# Patient Record
Sex: MALE | Race: WHITE | NOT HISPANIC OR LATINO | Employment: UNEMPLOYED | URBAN - METROPOLITAN AREA
[De-identification: names, ages, dates, MRNs, and addresses within clinical notes are randomized per-mention and may not be internally consistent; named-entity substitution may affect disease eponyms.]

---

## 2021-08-14 ENCOUNTER — IMMUNIZATION (OUTPATIENT)
Dept: PRIMARY CARE CLINIC | Facility: CLINIC | Age: 21
End: 2021-08-14
Payer: COMMERCIAL

## 2021-08-14 DIAGNOSIS — Z23 NEED FOR VACCINATION: Primary | ICD-10-CM

## 2021-08-14 PROCEDURE — 91303 COVID-19,VECTOR-NR,RS-AD26,PF,0.5 ML DOSE VACCINE (JANSSEN): ICD-10-PCS | Mod: S$GLB,,, | Performed by: FAMILY MEDICINE

## 2021-08-14 PROCEDURE — 0031A COVID-19,VECTOR-NR,RS-AD26,PF,0.5 ML DOSE VACCINE (JANSSEN): CPT | Mod: CV19,S$GLB,, | Performed by: FAMILY MEDICINE

## 2021-08-14 PROCEDURE — 91303 COVID-19,VECTOR-NR,RS-AD26,PF,0.5 ML DOSE VACCINE (JANSSEN): CPT | Mod: S$GLB,,, | Performed by: FAMILY MEDICINE

## 2021-08-14 PROCEDURE — 0031A COVID-19,VECTOR-NR,RS-AD26,PF,0.5 ML DOSE VACCINE (JANSSEN): ICD-10-PCS | Mod: CV19,S$GLB,, | Performed by: FAMILY MEDICINE

## 2022-01-08 ENCOUNTER — OFFICE VISIT (OUTPATIENT)
Dept: URGENT CARE | Facility: CLINIC | Age: 22
End: 2022-01-08
Payer: COMMERCIAL

## 2022-01-08 VITALS
HEART RATE: 68 BPM | DIASTOLIC BLOOD PRESSURE: 82 MMHG | SYSTOLIC BLOOD PRESSURE: 125 MMHG | TEMPERATURE: 98 F | WEIGHT: 175 LBS | OXYGEN SATURATION: 98 %

## 2022-01-08 DIAGNOSIS — H66.93 BILATERAL ACUTE OTITIS MEDIA: Primary | ICD-10-CM

## 2022-01-08 PROCEDURE — 1160F RVW MEDS BY RX/DR IN RCRD: CPT | Mod: S$GLB,,, | Performed by: STUDENT IN AN ORGANIZED HEALTH CARE EDUCATION/TRAINING PROGRAM

## 2022-01-08 PROCEDURE — 3074F PR MOST RECENT SYSTOLIC BLOOD PRESSURE < 130 MM HG: ICD-10-PCS | Mod: S$GLB,,, | Performed by: STUDENT IN AN ORGANIZED HEALTH CARE EDUCATION/TRAINING PROGRAM

## 2022-01-08 PROCEDURE — 99203 PR OFFICE/OUTPT VISIT, NEW, LEVL III, 30-44 MIN: ICD-10-PCS | Mod: S$GLB,,, | Performed by: STUDENT IN AN ORGANIZED HEALTH CARE EDUCATION/TRAINING PROGRAM

## 2022-01-08 PROCEDURE — 3079F PR MOST RECENT DIASTOLIC BLOOD PRESSURE 80-89 MM HG: ICD-10-PCS | Mod: S$GLB,,, | Performed by: STUDENT IN AN ORGANIZED HEALTH CARE EDUCATION/TRAINING PROGRAM

## 2022-01-08 PROCEDURE — 3074F SYST BP LT 130 MM HG: CPT | Mod: S$GLB,,, | Performed by: STUDENT IN AN ORGANIZED HEALTH CARE EDUCATION/TRAINING PROGRAM

## 2022-01-08 PROCEDURE — 1159F MED LIST DOCD IN RCRD: CPT | Mod: S$GLB,,, | Performed by: STUDENT IN AN ORGANIZED HEALTH CARE EDUCATION/TRAINING PROGRAM

## 2022-01-08 PROCEDURE — 3079F DIAST BP 80-89 MM HG: CPT | Mod: S$GLB,,, | Performed by: STUDENT IN AN ORGANIZED HEALTH CARE EDUCATION/TRAINING PROGRAM

## 2022-01-08 PROCEDURE — 1160F PR REVIEW ALL MEDS BY PRESCRIBER/CLIN PHARMACIST DOCUMENTED: ICD-10-PCS | Mod: S$GLB,,, | Performed by: STUDENT IN AN ORGANIZED HEALTH CARE EDUCATION/TRAINING PROGRAM

## 2022-01-08 PROCEDURE — 1159F PR MEDICATION LIST DOCUMENTED IN MEDICAL RECORD: ICD-10-PCS | Mod: S$GLB,,, | Performed by: STUDENT IN AN ORGANIZED HEALTH CARE EDUCATION/TRAINING PROGRAM

## 2022-01-08 PROCEDURE — 99203 OFFICE O/P NEW LOW 30 MIN: CPT | Mod: S$GLB,,, | Performed by: STUDENT IN AN ORGANIZED HEALTH CARE EDUCATION/TRAINING PROGRAM

## 2022-01-08 RX ORDER — AMOXICILLIN 875 MG/1
875 TABLET, FILM COATED ORAL EVERY 12 HOURS
Qty: 14 TABLET | Refills: 0 | Status: SHIPPED | OUTPATIENT
Start: 2022-01-08 | End: 2022-01-15

## 2022-01-08 NOTE — PROGRESS NOTES
Subjective:       Patient ID: Ashutosh Kohler is a 21 y.o. male.    Vitals:  weight is 79.4 kg (175 lb). His temperature is 98.2 °F (36.8 °C). His blood pressure is 125/82 and his pulse is 68. His oxygen saturation is 98%.     Chief Complaint: Otalgia (bilateral)    Patient is a 21-year-old male who presents to clinic for evaluation of possible ear infection.  Patient reports symptoms times 3-4 days.  Patient states has taken Tylenol with mild relief of symptoms.  Patient describes pain to be located to both ears.  Patient describes pain to be constant and sharp in nature.  Patient rates pain at current a 4 on 10 scale in as worse a 7 or 8 on 10 scale.  Patient denies any drainage, hearing loss or ringing to the ears.  Patient also denies any acute fever or chills, congestion or sore throat, chest pain or palpitations, shortness of breath or cough, generalized body aches, abdominal pain, nausea or vomiting, diarrhea, or any headaches or dizziness.  Patient states pain seems to be just localized to the ears.    Otalgia   There is pain in both ears. This is a new problem. The current episode started in the past 7 days. The problem occurs constantly. The problem has been unchanged. There has been no fever. The pain is at a severity of 4/10. The pain is mild. Pertinent negatives include no abdominal pain, coughing, diarrhea, ear discharge, headaches, hearing loss, rash, sore throat or vomiting. He has tried acetaminophen for the symptoms. The treatment provided mild relief.       Constitution: Negative. Negative for chills, sweating, fatigue and fever.   HENT: Positive for ear pain. Negative for ear discharge, hearing loss, congestion and sore throat.    Neck: neck negative.   Cardiovascular: Negative.  Negative for chest pain and palpitations.   Eyes: Negative.    Respiratory: Negative.  Negative for chest tightness, cough and shortness of breath.    Gastrointestinal: Negative.  Negative for abdominal pain, nausea,  vomiting and diarrhea.   Endocrine: negative.   Genitourinary: Negative.    Musculoskeletal: Negative.  Negative for muscle ache.   Skin: Negative.  Negative for color change, pale, rash and erythema.   Allergic/Immunologic: Negative.    Neurological: Negative.  Negative for dizziness, light-headedness, passing out, headaches, disorientation and altered mental status.   Hematologic/Lymphatic: Negative.    Psychiatric/Behavioral: Negative.  Negative for altered mental status, disorientation and confusion.       Objective:      Physical Exam   Constitutional: He is oriented to person, place, and time. He appears well-developed and well-nourished. He is cooperative.  Non-toxic appearance. He does not appear ill. No distress.   HENT:   Head: Normocephalic and atraumatic.   Ears:   Right Ear: Hearing, external ear and ear canal normal. Tympanic membrane is erythematous and bulging.   Left Ear: Hearing, external ear and ear canal normal. Tympanic membrane is erythematous and bulging.   Nose: Nose normal. No mucosal edema, rhinorrhea, nasal deformity or congestion. No epistaxis. Right sinus exhibits no maxillary sinus tenderness and no frontal sinus tenderness. Left sinus exhibits no maxillary sinus tenderness and no frontal sinus tenderness.   Mouth/Throat: Uvula is midline, oropharynx is clear and moist and mucous membranes are normal. Mucous membranes are moist. No trismus in the jaw. Normal dentition. No uvula swelling. No oropharyngeal exudate or posterior oropharyngeal erythema. Oropharynx is clear.   Eyes: Conjunctivae and lids are normal. Right eye exhibits no discharge. Left eye exhibits no discharge. No scleral icterus.   Neck: Trachea normal and phonation normal. Neck supple. No neck rigidity present.   Cardiovascular: Normal rate, regular rhythm, normal heart sounds, intact distal pulses and normal pulses.   Pulmonary/Chest: Effort normal and breath sounds normal. No respiratory distress. He has no wheezes. He  has no rhonchi. He has no rales.   Abdominal: Normal appearance and bowel sounds are normal. He exhibits no distension. Soft. There is no abdominal tenderness.   Musculoskeletal: Normal range of motion.         General: No deformity or edema. Normal range of motion.      Cervical back: He exhibits no tenderness.   Lymphadenopathy:     He has no cervical adenopathy.   Neurological: He is alert and oriented to person, place, and time. He exhibits normal muscle tone. Coordination normal.   Skin: Skin is warm, dry, intact, not diaphoretic, not pale and no rash. Capillary refill takes less than 2 seconds. No erythema   Psychiatric: He has a normal mood and affect. His speech is normal and behavior is normal. Judgment and thought content normal.   Nursing note and vitals reviewed.        Assessment:       1. Bilateral acute otitis media          Plan:         Bilateral acute otitis media    Other orders  -     amoxicillin (AMOXIL) 875 MG tablet; Take 1 tablet (875 mg total) by mouth every 12 (twelve) hours. for 7 days  Dispense: 14 tablet; Refill: 0                 Take medications as prescribed.  Tylenol/Motrin per package instructions for any pain or fever.  Follow-up PCP as needed.  Return to clinic as needed.  To ED for any new or acutely worsening symptoms.  Patient in agreement with plan of care.

## 2022-01-08 NOTE — PATIENT INSTRUCTIONS
Patient Education       Ear Infection ED   General Information   You came to the Emergency Department (ED) for an ear infection. An ear infection can cause ear pain and fever. You might also have trouble hearing from fluid buildup in the middle ear behind the eardrum.  Most ear infections are caused by viruses, but some are caused by bacteria. The doctor will wait to see if you get better on your own if they think the cause is a virus. The doctor will order antibiotic if they think the cause is a bacteria. Antibiotics kill bacteria, but they do not work on viruses.  If the doctor orders antibiotics, be sure to take all of them, even if you start to feel better.  What care is needed at home?   · Call your regular doctor to let them know you were in the ED. Make a follow-up appointment if you were told to.  · Do not put anything in your ear unless it was ordered by the doctor.  · You may want to take medicines like ibuprofen, naproxen, or acetaminophen to help with pain.  When do I need to call the doctor?   · Your symptoms are not getting better in 2 to 3 days.  · You continue to have problems hearing after 2 to 3 weeks.  · You have a fever of 100.4°F (38°C) or higher or chills.  · You have discharge or fluid coming from your ear.  · You have new or worsening symptoms.  Last Reviewed Date   2020-12-16  Consumer Information Use and Disclaimer   This information is not specific medical advice and does not replace information you receive from your health care provider. This is only a brief summary of general information. It does NOT include all information about conditions, illnesses, injuries, tests, procedures, treatments, therapies, discharge instructions or life-style choices that may apply to you. You must talk with your health care provider for complete information about your health and treatment options. This information should not be used to decide whether or not to accept your health care providers advice,  instructions or recommendations. Only your health care provider has the knowledge and training to provide advice that is right for you.  Copyright   Copyright © 2021 [employer] and its affiliates and/or licensors. All rights reserved.

## 2023-06-16 ENCOUNTER — LAB VISIT (OUTPATIENT)
Dept: LAB | Facility: HOSPITAL | Age: 23
End: 2023-06-16
Attending: STUDENT IN AN ORGANIZED HEALTH CARE EDUCATION/TRAINING PROGRAM
Payer: COMMERCIAL

## 2023-06-16 ENCOUNTER — HOSPITAL ENCOUNTER (OUTPATIENT)
Dept: PULMONOLOGY | Facility: CLINIC | Age: 23
Discharge: HOME | End: 2023-06-16
Payer: COMMERCIAL

## 2023-06-16 ENCOUNTER — OFFICE VISIT (OUTPATIENT)
Dept: ALLERGY | Facility: CLINIC | Age: 23
End: 2023-06-16
Payer: COMMERCIAL

## 2023-06-16 VITALS — SYSTOLIC BLOOD PRESSURE: 119 MMHG | DIASTOLIC BLOOD PRESSURE: 69 MMHG

## 2023-06-16 DIAGNOSIS — Z91.09 OTHER ALLERGY STATUS, OTHER THAN TO DRUGS AND BIOLOGICAL SUBSTANCES: ICD-10-CM

## 2023-06-16 DIAGNOSIS — Z86.59 HISTORY OF ADHD: ICD-10-CM

## 2023-06-16 DIAGNOSIS — Z91.010 HX OF PEANUT ALLERGY: ICD-10-CM

## 2023-06-16 DIAGNOSIS — J31.0 CHRONIC RHINITIS: ICD-10-CM

## 2023-06-16 DIAGNOSIS — Z91.010 HX OF PEANUT ALLERGY: Primary | ICD-10-CM

## 2023-06-16 DIAGNOSIS — R05.9 COUGH, UNSPECIFIED TYPE: Primary | ICD-10-CM

## 2023-06-16 DIAGNOSIS — Z87.09 HISTORY OF EXTRINSIC ASTHMA: ICD-10-CM

## 2023-06-16 DIAGNOSIS — R05.9 COUGH, UNSPECIFIED TYPE: ICD-10-CM

## 2023-06-16 LAB
FEF 25 75 LLN: 3.06
FEF 25 75 PRE REF: 73.3 %
FEF 25 75 REF: 4.74
FET100 CHG: -11.4 %
FEV05 LLN: 1.64
FEV05 REF: 3.07
FEV1 CHG: 1.4 %
FEV1 FVC LLN: 74
FEV1 FVC PRE REF: 85.2 %
FEV1 FVC REF: 85
FEV1 LLN: 3.55
FEV1 PRE REF: 100.7 %
FEV1 REF: 4.38
FEV1 VOL CHG: 0.06
FVC CHG: 1.5 %
FVC LLN: 4.19
FVC PRE REF: 117.4 %
FVC REF: 5.17
FVC VOL CHG: 0.09
IGE SERPL-ACNC: 58 IU/ML (ref 0–100)
PEF LLN: 7.5
PEF PRE REF: 83.7 %
PEF REF: 9.69
PHYSICIAN COMMENT: ABNORMAL
POST FEF 25 75: 3.64 L/S (ref 3.06–6.8)
POST FET 100: 6.59 SEC
POST FEV1 FVC: 72.63 % (ref 73.55–95.05)
POST FEV1: 4.47 L (ref 3.55–5.19)
POST FEV5: 3.04 L (ref 1.64–4.5)
POST FVC: 6.16 L (ref 4.19–6.16)
POST PEF: 7.54 L/S (ref 7.5–11.87)
PRE FEF 25 75: 3.48 L/S (ref 3.06–6.8)
PRE FET 100: 7.44 SEC
PRE FEV05 REF: 97 %
PRE FEV1 FVC: 72.68 % (ref 73.55–95.05)
PRE FEV1: 4.41 L (ref 3.55–5.19)
PRE FEV5: 2.98 L (ref 1.64–4.5)
PRE FVC: 6.07 L (ref 4.19–6.16)
PRE PEF: 8.11 L/S (ref 7.5–11.87)

## 2023-06-16 PROCEDURE — 1159F MED LIST DOCD IN RCRD: CPT | Mod: CPTII,S$GLB,, | Performed by: STUDENT IN AN ORGANIZED HEALTH CARE EDUCATION/TRAINING PROGRAM

## 2023-06-16 PROCEDURE — 99205 OFFICE O/P NEW HI 60 MIN: CPT | Mod: S$GLB,,, | Performed by: STUDENT IN AN ORGANIZED HEALTH CARE EDUCATION/TRAINING PROGRAM

## 2023-06-16 PROCEDURE — 1159F PR MEDICATION LIST DOCUMENTED IN MEDICAL RECORD: ICD-10-PCS | Mod: CPTII,S$GLB,, | Performed by: STUDENT IN AN ORGANIZED HEALTH CARE EDUCATION/TRAINING PROGRAM

## 2023-06-16 PROCEDURE — 3074F PR MOST RECENT SYSTOLIC BLOOD PRESSURE < 130 MM HG: ICD-10-PCS | Mod: CPTII,S$GLB,, | Performed by: STUDENT IN AN ORGANIZED HEALTH CARE EDUCATION/TRAINING PROGRAM

## 2023-06-16 PROCEDURE — 3078F DIAST BP <80 MM HG: CPT | Mod: CPTII,S$GLB,, | Performed by: STUDENT IN AN ORGANIZED HEALTH CARE EDUCATION/TRAINING PROGRAM

## 2023-06-16 PROCEDURE — 1160F PR REVIEW ALL MEDS BY PRESCRIBER/CLIN PHARMACIST DOCUMENTED: ICD-10-PCS | Mod: CPTII,S$GLB,, | Performed by: STUDENT IN AN ORGANIZED HEALTH CARE EDUCATION/TRAINING PROGRAM

## 2023-06-16 PROCEDURE — 82785 ASSAY OF IGE: CPT | Performed by: STUDENT IN AN ORGANIZED HEALTH CARE EDUCATION/TRAINING PROGRAM

## 2023-06-16 PROCEDURE — 86003 ALLG SPEC IGE CRUDE XTRC EA: CPT | Mod: 59 | Performed by: STUDENT IN AN ORGANIZED HEALTH CARE EDUCATION/TRAINING PROGRAM

## 2023-06-16 PROCEDURE — 3078F PR MOST RECENT DIASTOLIC BLOOD PRESSURE < 80 MM HG: ICD-10-PCS | Mod: CPTII,S$GLB,, | Performed by: STUDENT IN AN ORGANIZED HEALTH CARE EDUCATION/TRAINING PROGRAM

## 2023-06-16 PROCEDURE — 99999 PR PBB SHADOW E&M-EST. PATIENT-LVL III: ICD-10-PCS | Mod: PBBFAC,,, | Performed by: STUDENT IN AN ORGANIZED HEALTH CARE EDUCATION/TRAINING PROGRAM

## 2023-06-16 PROCEDURE — 3074F SYST BP LT 130 MM HG: CPT | Mod: CPTII,S$GLB,, | Performed by: STUDENT IN AN ORGANIZED HEALTH CARE EDUCATION/TRAINING PROGRAM

## 2023-06-16 PROCEDURE — 94060 EVALUATION OF WHEEZING: CPT | Mod: S$GLB,,, | Performed by: INTERNAL MEDICINE

## 2023-06-16 PROCEDURE — 94060 PR EVAL OF BRONCHOSPASM: ICD-10-PCS | Mod: S$GLB,,, | Performed by: INTERNAL MEDICINE

## 2023-06-16 PROCEDURE — 86003 ALLG SPEC IGE CRUDE XTRC EA: CPT | Performed by: STUDENT IN AN ORGANIZED HEALTH CARE EDUCATION/TRAINING PROGRAM

## 2023-06-16 PROCEDURE — 99205 PR OFFICE/OUTPT VISIT, NEW, LEVL V, 60-74 MIN: ICD-10-PCS | Mod: S$GLB,,, | Performed by: STUDENT IN AN ORGANIZED HEALTH CARE EDUCATION/TRAINING PROGRAM

## 2023-06-16 PROCEDURE — 99999 PR PBB SHADOW E&M-EST. PATIENT-LVL III: CPT | Mod: PBBFAC,,, | Performed by: STUDENT IN AN ORGANIZED HEALTH CARE EDUCATION/TRAINING PROGRAM

## 2023-06-16 PROCEDURE — 1160F RVW MEDS BY RX/DR IN RCRD: CPT | Mod: CPTII,S$GLB,, | Performed by: STUDENT IN AN ORGANIZED HEALTH CARE EDUCATION/TRAINING PROGRAM

## 2023-06-16 PROCEDURE — 36415 COLL VENOUS BLD VENIPUNCTURE: CPT | Performed by: STUDENT IN AN ORGANIZED HEALTH CARE EDUCATION/TRAINING PROGRAM

## 2023-06-16 PROCEDURE — 86008 ALLG SPEC IGE RECOMB EA: CPT | Mod: 59 | Performed by: STUDENT IN AN ORGANIZED HEALTH CARE EDUCATION/TRAINING PROGRAM

## 2023-06-16 RX ORDER — ALBUTEROL SULFATE 0.83 MG/ML
2.5 SOLUTION RESPIRATORY (INHALATION)
COMMUNITY

## 2023-06-16 NOTE — PROGRESS NOTES
Allergy Clinic Note  [facility]    This note was created by combination of typed  and M-Modal dictation. Transcription errors may be present.  If there are any questions, please contact me.    Subjective:      Patient ID: Ashutosh Kohler is a 22 y.o. male.    Chief Complaint: No chief complaint on file.      Referring Provider: Self, Aaareferral    History of Present Illness: Ashutosh Kohler is a 22 y.o. male referred from [facility] ( processing station) for evaluation of peanut allergy    Related medications and other interventions  Albuterol MDI or neb prn  Claritin  (Concerta 36 mg CR)    6/16/23:  At initial visit, mom reported that Ashutosh has never eaten peanuts or peanut butter.  She was concerned because touching peanuts or peanut butter caused itching.  He says he has no problems smelling peanuts or peanut butter but that he might sneeze if he is in close proximity.  He avoids peanuts and all tree nuts with no accidental exposures.  Unable to skin test today due to antihistamines on board.      Ashutosh also has a history of asthma as a child.  He has no history of ER visits.  He was diagnosed in grade school.  For the most part his asthma has been mild and intermittent, treated only with albuterol as needed.  For a brief.  Around age 14 he was treated with controller medications and received 2 courses of systemic steroids.  When his asthma was at its worst, his triggers were exercise, upper respiratory tract infection, smoke and when his allergies were severe.  He states no problems since age 13 and no restrictions related to breathing.    According to out side pharmacy record  -Had prednisone courses in 2012 and 2015.    -Was on Asthmacort 220 1 p BID from 9/9/2014 to 9/9/2015    Other allergic symptoms  1. Eczema as a baby  2. Rhinitis treated with antihistamines    Ashutosh's past medical history is significant for ADHD.  He says he was diagnose by a psychiatrist in 6  grade and what is on medicines very briefly which he discontinued due to side effects.  He is had no additional metal: Counters for ADD, no special accommodations, and no disciplinary problems in middle school or high school.   MEDICAL HISTORY      Significant past medical history: ADHD prev Rx Concerta, Hx depressive disorder  Active problem list reviewed  ENT surgery:  No    Significant family history:  Exposures: brother with allergic rhinitis  Smoking Hx:  Client  reports that he has never smoked. He does not have any smokeless tobacco history on file.    Meds: MAR reviewed    Asthma: Yes  Eczema:  Yes as an infant  Rhinitis:  Yes, takes Claritin as needed  Drug allergy/intolerance: NKDA  Venom allergy:  No  Latex allergy:  No    Patient Active Problem List   Diagnosis    Asthma, well controlled    Patient non adherence    ADHD (attention deficit hyperactivity disorder)    Depressive disorder, not elsewhere classified    Cough     Medication List with Changes/Refills   Current Medications    ALBUTEROL (PROVENTIL) 2.5 MG /3 ML (0.083 %) NEBULIZER SOLUTION    Inhale 2.5 mg into the lungs.       Start Date: --        End Date: --    ALBUTEROL 90 MCG/ACTUATION INHALER    2 puffs every 4-6 hours as needed for cough, wheeze, shortness of breath       Start Date: 9/9/2014  End Date: 10/9/2014    LORATADINE (CLARITIN) 10 MG TABLET    Take 10 mg by mouth once daily.         Start Date: --        End Date: --    METHYLPHENIDATE (CONCERTA) 36 MG CR TABLET    Take 1 tablet (36 mg total) by mouth every morning.       Start Date: 4/15/2015 End Date: 5/15/2015    MOMETASONE 220 MCG (30 DOSES) INHALER    Inhale 1 puff into the lungs 2 (two) times daily.       Start Date: 9/9/2014  End Date: 9/9/2015    PREDNISONE (DELTASONE) 20 MG TABLET    2 tablets daily for 5 days       Start Date: 3/29/2012 End Date: --         REVIEW OF SYSTEMS      CONST: no F/C/NS, no unintentional weight changes  NEURO:  no tremor, no weakness  EYES: no  discharge, no pruritus, no erythema  EARS: no hearing loss, no sensation of fullness  NOSE: + congestion, no rhinorrhea, no sneezing  PULM:  no SOB, no wheezing, no cough  CV: no CP, no palpitations, no leg swelling  GI:  no abdominal pain, no blood in stool  :  no dysuria, no hematuria  DERM: no rashes, no skin breaks    PHYSICAL EXAM     /69   GEN: Awake and alert, no distress  DERM: No flushing, No rashes  EYE:  No occular discharge, no redness  HEENT: No nasal discharge, no hoarseness, TMs are clear bilaterally.  Nares are pale with moderate to severe turbinate swelling.  Oropharynx is benign without exudate.  Tongue is not coated.  NECK: No LAD  PULM: Normal work of breathing, no cough  NEURO:  No focal deficit, speech fluent and logical  PSYCH: appropriate affect, normal behavior    MEDICAL DECISION MAKING     Data reviewed (new entries in bold-face)      Allergy Testing            Lab results           Pulmonary testing      Pre post bronchodilator spirometry (06/16/2023)  Good inspiratory and expiratory flow volume loops  Normal baseline spirometry   No change following bronchodilator  Interpretation:  Normal spirometry      Medical records review          MEDICAL DECISION-MAKING     Diagnoses:     Ashutosh Kohler is a 22 y.o. male. with  1. Hx of peanut allergy    2. food allergy status    3. Chronic rhinitis    4. History of extrinsic asthma    5. History of ADHD          Plan:   Ashutosh is presenting with a history of peanut allergy with food contact and no history of ingestion.  We are unable to skin test today as he has antihistamines in his system.  Instead will check peanuts, peanut components, and all tree nuts by Immunocap method.  Depending on results, may perform peanut challenge in the allergy clinic.    Ashutosh has a previous history of mild intermittent asthma.  He has no history or pharmacy data to suggest any active disease after 2015.  Spirometry today is completely  normal.    Ashutosh has some fairly significant chronic rhinitis which is probably allergic.  We do not address this today.    Ashutosh also has a brief history of a ADHD around 6 grade with no problems since.      Hx of peanut allergy  -     Peanut IgE; Future; Expected date: 06/23/2023  -     Allergen, Peanut Components IGE; Future; Expected date: 06/16/2023    food allergy status  -     Almonds IgE; Future; Expected date: 06/23/2023  -     Brazil Nut IgE; Future; Expected date: 06/23/2023  -     Cashew IgE; Future; Expected date: 06/23/2023  -     Hazelnut IgE; Future; Expected date: 06/16/2023  -     Allergen, Macadamia Nut; Future; Expected date: 06/16/2023  -     [place] IgE; Future; Expected date: 06/23/2023  -     Allergen - Pistachio; Future; Expected date: 06/16/2023  -     Pecan Nut IgE; Future; Expected date: 06/23/2023  -     Banana IgE; Future; Expected date: 06/16/2023  -     IgE; Future; Expected date: 07/16/2023    History of extrinsic asthma - quiescent by Hx and with normal spirometry    History of ADHD --quiescent by history and pharmacy records since 6 grade    Chronic rhinitis  --not addressed      PATIENT INSTRUCTIONS AND FOLLOW-UP     There are no Patient Instructions on file for this visit.    No follow-ups on file.        Shalonda Dorantes MD  Allergy, Asthma & Immunology        I spent a total of 64 minutes on the day of the visit.This includes face to face time and non-face to face time preparing to see the patient (eg, review of tests), obtaining and/or reviewing separately obtained history, documenting clinical information in the electronic or other health record, independently interpreting results and communicating results to the patient/family/caregiver, or care coordinator.

## 2023-06-20 LAB
ALLERGY INTERPRETATION: ABNORMAL
ALMOND IGE QN: <0.1 KU/L
BANANA IGE QN: <0.1 KU/L
BRAZIL NUT IGE QN: <0.1 KU/L
CASHEW NUT IGE QN: <0.1 KU/L
DEPRECATED ALMOND IGE RAST QL: NORMAL
DEPRECATED BANANA IGE RAST QL: NORMAL
DEPRECATED BRAZIL NUT IGE RAST QL: NORMAL
DEPRECATED CASHEW NUT IGE RAST QL: NORMAL
DEPRECATED HAZELNUT IGE RAST QL: ABNORMAL
DEPRECATED MACADAMIA IGE RAST QL: ABNORMAL
DEPRECATED PEANUT (RARA H) 2 IGE RAST QL: ABNORMAL
DEPRECATED PEANUT (RARA H) 2 IGE RAST QL: ABNORMAL
DEPRECATED PEANUT (RARA H) 3 IGE RAST QL: ABNORMAL
DEPRECATED PEANUT (RARA H) 6 IGE RAST QL: ABNORMAL
DEPRECATED PEANUT (RARA H) 8 IGE RAST QL: ABNORMAL
DEPRECATED PEANUT IGE RAST QL: ABNORMAL
DEPRECATED PECAN/HICK NUT IGE RAST QL: NORMAL
DEPRECATED PISTACHIO IGE RAST QL: NORMAL
DEPRECATED WALNUT IGE RAST QL: ABNORMAL
HAZELNUT IGE QN: 1.16 KU/L
MACADAMIA IGE QN: 0.18 KU/L
PEANUT (RARA H) 1 IGE QN: <0.1 KU/L
PEANUT (RARA H) 2 IGE QN: 0.17 KU/L
PEANUT (RARA H) 3 IGE QN: <0.1 KU/L
PEANUT (RARA H) 6 IGE QN: <0.1 KU/L
PEANUT (RARA H) 8 IGE QN: 1.39 KU/L
PEANUT (RARA H) 9 IGE QN: <0.1 KU/L
PEANUT (RARA H) 9 IGE QN: ABNORMAL
PEANUT IGE QN: 0.3 KU/L
PECAN/HICK NUT IGE QN: <0.1 KU/L
PISTACHIO IGE QN: <0.1 KU/L
WALNUT IGE QN: 0.48 KU/L

## 2023-06-22 ENCOUNTER — TELEPHONE (OUTPATIENT)
Dept: NEPHROLOGY | Facility: CLINIC | Age: 23
End: 2023-06-22
Payer: COMMERCIAL

## 2023-06-22 ENCOUNTER — PATIENT MESSAGE (OUTPATIENT)
Dept: ALLERGY | Facility: CLINIC | Age: 23
End: 2023-06-22
Payer: COMMERCIAL